# Patient Record
Sex: MALE | Race: ASIAN | NOT HISPANIC OR LATINO
[De-identification: names, ages, dates, MRNs, and addresses within clinical notes are randomized per-mention and may not be internally consistent; named-entity substitution may affect disease eponyms.]

---

## 2019-03-06 ENCOUNTER — APPOINTMENT (OUTPATIENT)
Dept: ORTHOPEDIC SURGERY | Facility: CLINIC | Age: 64
End: 2019-03-06
Payer: COMMERCIAL

## 2019-03-06 VITALS
HEIGHT: 61 IN | HEART RATE: 51 BPM | DIASTOLIC BLOOD PRESSURE: 95 MMHG | WEIGHT: 130 LBS | BODY MASS INDEX: 24.55 KG/M2 | SYSTOLIC BLOOD PRESSURE: 164 MMHG

## 2019-03-06 PROCEDURE — 73030 X-RAY EXAM OF SHOULDER: CPT | Mod: LT

## 2019-03-06 PROCEDURE — 99203 OFFICE O/P NEW LOW 30 MIN: CPT

## 2019-03-06 NOTE — PHYSICAL EXAM
[de-identified] : General Exam\par \par Well developed, well nourished\par No apparent distress\par Oriented to person, place, and time\par Mood: Normal\par Affect: Normal\par Balance and coordination: Normal\par Gait: Normal\par \par Left shoulder exam\par \par Inspection: No swelling, ecchymosis or gross deformity.\par Skin: No masses, No lesions\par Neck: Negative Spurlings, full ROM without pain\par Tenderness: No bicipital tenderness, no tenderness to the greater tuberosity/RTC insertion, no anterior shoulder/lesser tuberosity tenderness. No tenderness SC joint, clavicle, AC joint.\par ROM: 160/60/T6\par Impingement tests: Positive Gorman\par AC Joint: no pain with cross arm testing\par Biceps: Negative speed\par Strength: 5-/5 abduction, 5/5 external rotation, and internal rotation\par Neuro: AIN, PIN, Ulnar nerve motor intact\par Sensation: Intact to light touch in radial, median, ulnar, and axillary nerve distributions\par Vasc: 2+ radial pulse\par  [de-identified] : \par The following radiographs were ordered and read by me during this patients visit. I reviewed each radiograph in detail with the patient and discussed the findings as highlighted below. \par \par 3 views left shoulder] were obtained today that show no fracture, dislocation. There is no degenerative change seen. There is no malalignment. No obvious osseous abnormality. Otherwise unremarkable.\par \par MRI left shoulder reviewed. There is high-grade partial thickness to near full-thickness tearing of the supraspinatus tendon.

## 2019-03-06 NOTE — HISTORY OF PRESENT ILLNESS
[de-identified] : 62yo male presents complaining of left shoulder pain for 3 months. Pain lateral aspect shoulder worse with overhead activity and reaching behind back. He has pain at night. He saw his PCP who sent him for MRI at [facility].  He has tried physical therapy for one month with mild relief. He has not tried any medicine or injections. No injuries reported. Denies numbness tingling\par \par The patient's past medical history, past surgical history, medications, allergies, and social history were reviewed by me today with the patient and documented accordingly. In addition, the patient's family history, which is noncontributory to this visit, was also reviewed.\par

## 2019-03-06 NOTE — ASSESSMENT
[FreeTextEntry1] : 63-year-old male left shoulder rotator cuff tear. An extensive nonoperative treatment he continues to experience pain we discussed continued nonoperative treatment including medication physical therapy medications injections. We discussed surgical treatment with arthroscopy and rotator cuff repair. He wishes to continue physical therapy at this time. If symptoms do not improve we'll again discuss arthroscopy. All questions answered

## 2019-05-22 ENCOUNTER — APPOINTMENT (OUTPATIENT)
Dept: ORTHOPEDIC SURGERY | Facility: CLINIC | Age: 64
End: 2019-05-22

## 2021-11-30 ENCOUNTER — APPOINTMENT (OUTPATIENT)
Dept: GASTROENTEROLOGY | Facility: CLINIC | Age: 66
End: 2021-11-30
Payer: MEDICARE

## 2021-11-30 DIAGNOSIS — M75.112 INCOMPLETE ROTATOR CUFF TEAR OR RUPTURE OF LEFT SHOULDER, NOT SPECIFIED AS TRAUMATIC: ICD-10-CM

## 2021-11-30 DIAGNOSIS — Z87.19 PERSONAL HISTORY OF OTHER DISEASES OF THE DIGESTIVE SYSTEM: ICD-10-CM

## 2021-11-30 DIAGNOSIS — D62 ACUTE POSTHEMORRHAGIC ANEMIA: ICD-10-CM

## 2021-11-30 PROCEDURE — 99204 OFFICE O/P NEW MOD 45 MIN: CPT | Mod: 95

## 2021-12-01 PROBLEM — M75.112 INCOMPLETE TEAR OF LEFT ROTATOR CUFF: Status: RESOLVED | Noted: 2019-03-06 | Resolved: 2021-12-01

## 2021-12-01 RX ORDER — FLUTICASONE PROPIONATE 50 UG/1
50 SPRAY, METERED NASAL
Qty: 16 | Refills: 0 | Status: ACTIVE | COMMUNITY
Start: 2021-06-22

## 2021-12-01 RX ORDER — AMLODIPINE BESYLATE 10 MG/1
10 TABLET ORAL
Qty: 90 | Refills: 0 | Status: ACTIVE | COMMUNITY
Start: 2021-05-10

## 2021-12-01 RX ORDER — METFORMIN HYDROCHLORIDE 500 MG/1
500 TABLET, COATED ORAL
Qty: 180 | Refills: 0 | Status: ACTIVE | COMMUNITY
Start: 2021-01-29

## 2021-12-01 RX ORDER — METOPROLOL SUCCINATE 25 MG/1
25 TABLET, EXTENDED RELEASE ORAL
Qty: 90 | Refills: 0 | Status: ACTIVE | COMMUNITY
Start: 2021-05-10

## 2021-12-14 NOTE — REASON FOR VISIT
[Initial Evaluation] : an initial evaluation [FreeTextEntry1] : history of colonic polyp resected in 2014, diverticular hemorrhage, onset of dyspepsia and weight loss

## 2021-12-14 NOTE — HISTORY OF PRESENT ILLNESS
[Home] : at home, [unfilled] , at the time of the visit. [Medical Office: ([facility])___] : at the medical office located in  [Spouse] : spouse [Verbal consent obtained from patient] : the patient, [unfilled] [de-identified] : weight loss of 5 lbs. in 6 months, occasional heartburn and dyspepsia\par \par Aba Trevizo, a 66 year old man, is seen for evaluation of weight loss of 5 lbs. over the past 6 months with occasional heartburn and dyspepsia.    \par \par He had been admitted in April 2014 for multiple episodes of hematochezia with drop in hemoglobin requiring 2 units of packed cells. He had had an episode of diverticulitis in December 2013. He had also reported intermittent LLQ pain, mild, not related to the rectal bleed. He denied similar episodes in the past. He also denied dizziness, chest pain, palpitation, nausea, vomiting or fever. A CT-angio of abdomen did not demonstrate any evidence of active bleeding. Colonoscopy revealed marked diverticulosis in the sigmoid and descending colon without active bleeding or a definitive source of bleeding. He has not had further hematochezia and feels well.\par \par He father had colon cancer.  There is no other family history of colon cancer, colon polyp, peptic ulcer disease, female genitourinary disease, liver disease, cholelithiasis, pancreatic disease or cancer, inflammatory bowel disease or celiac disease.

## 2021-12-14 NOTE — HISTORY OF PRESENT ILLNESS
[Home] : at home, [unfilled] , at the time of the visit. [Medical Office: ([facility])___] : at the medical office located in  [Spouse] : spouse [Verbal consent obtained from patient] : the patient, [unfilled] [de-identified] : This visit was performed via Telehealth realtime 2-way audiovisual technology and lasted 50 minutes. \par \par Aba Trevizo, a  66 year old man, is seen evaluation of unintentional weight loss of 5 lbs. in the past  6 months associated with early satiety,  occasional heartburn and dyspepsia.  He is concerned about this problem, especially since there is a family history of cancer; his father had colon cancer.  THe patient's last colonoscopy was in 2014 when a hyperplastic polyp was resected from the sigmoid colon and marked diverticulosis was noted in the left colon.  Internal hemorrhoids were also noted.  He denies nausea, vomiting, dysphagia, hematochezia, melena.\par \par I last saw him after discharge from [facility] in 2014.  . He had been admitted in April 2014 for multiple episodes of hematochezia with drop in hemoglobin requiring 2 units of packed cells. He had had an episode of diverticulitis in December 2013. He had also reported intermittent LLQ pain, mild, not related to the rectal bleed. He denied similar episodes in the past. He also denied dizziness, chest pain, palpitation, nausea, vomiting or fever. A CT-angio of abdomen did not demonstrate any evidence of active bleeding. Colonoscopy revealed marked diverticulosis in the sigmoid and descending colon without active bleeding or a definitive source of bleeding. He has not had further hematochezia and feels well.\par  \par His father had colon cancer.  There is no other family history of colon cancer, colon polyp, peptic ulcer disease, female genitourinary disease, liver disease, cholelithiasis, pancreatic disease or cancer, inflammatory bowel disease or celiac disease.

## 2021-12-14 NOTE — ASSESSMENT
[FreeTextEntry1] : Assessment\par 1) increased risk for colon cancer in view of family history in a first degree relative\par 2) unintentional weight loss with dyspepsia is suggestive of an UGI lesion such as ulcer or tumor, or pancreatic lesion\par 3) diverticulosis with history of diverticular hemorrhage\par \par Plan\par 1) high fiber diet with popcorn\par 2) EGD and  Colonoscopy risks, benefits and preparation discussed with the patient \par 3) blood test results from primary care MD\par 4) office follow-up after procedures

## 2021-12-14 NOTE — ASSESSMENT
[FreeTextEntry1] : Assessment\par 1) Colon Cancer high risk due to history in a first degree relative\par 2) unintentional weight loss with dyspepsia, rule out UGI lesion such as gastric ulcer, tumor, pancreatic lesion\par 3) diverticulosis with previous history of diverticular hemorrhage\par \par Plan\par 1) EGD and  Colonoscopy risks, benefits and preparation discussed with the patient \par 2) high fiber diet including popcorn for diverticulosis\par 3) blood test results from his primary care physician

## 2022-03-09 ENCOUNTER — APPOINTMENT (OUTPATIENT)
Dept: GASTROENTEROLOGY | Facility: HOSPITAL | Age: 67
End: 2022-03-09

## 2022-03-09 ENCOUNTER — OUTPATIENT (OUTPATIENT)
Dept: OUTPATIENT SERVICES | Facility: HOSPITAL | Age: 67
LOS: 1 days | Discharge: HOME | End: 2022-03-09
Payer: MEDICARE

## 2022-03-09 ENCOUNTER — RESULT REVIEW (OUTPATIENT)
Age: 67
End: 2022-03-09

## 2022-03-09 VITALS
TEMPERATURE: 98 F | RESPIRATION RATE: 18 BRPM | SYSTOLIC BLOOD PRESSURE: 158 MMHG | DIASTOLIC BLOOD PRESSURE: 88 MMHG | HEART RATE: 64 BPM | OXYGEN SATURATION: 100 % | WEIGHT: 138.01 LBS | HEIGHT: 64 IN

## 2022-03-09 VITALS
DIASTOLIC BLOOD PRESSURE: 81 MMHG | OXYGEN SATURATION: 99 % | HEART RATE: 60 BPM | SYSTOLIC BLOOD PRESSURE: 136 MMHG | RESPIRATION RATE: 21 BRPM

## 2022-03-09 DIAGNOSIS — K63.5 POLYP OF COLON: ICD-10-CM

## 2022-03-09 DIAGNOSIS — Z98.89 OTHER SPECIFIED POSTPROCEDURAL STATES: Chronic | ICD-10-CM

## 2022-03-09 LAB
GLUCOSE BLDC GLUCOMTR-MCNC: 110 MG/DL — HIGH (ref 70–99)
GLUCOSE BLDC GLUCOMTR-MCNC: 98 MG/DL — SIGNIFICANT CHANGE UP (ref 70–99)

## 2022-03-09 PROCEDURE — 88305 TISSUE EXAM BY PATHOLOGIST: CPT | Mod: 26

## 2022-03-09 PROCEDURE — 45378 DIAGNOSTIC COLONOSCOPY: CPT

## 2022-03-09 PROCEDURE — 43239 EGD BIOPSY SINGLE/MULTIPLE: CPT

## 2022-03-09 RX ORDER — POLYETHYLENE GLYCOL 3350 AND ELECTROLYTES WITH LEMON FLAVOR 236; 22.74; 6.74; 5.86; 2.97 G/4L; G/4L; G/4L; G/4L; G/4L
236 POWDER, FOR SOLUTION ORAL
Qty: 1 | Refills: 0 | Status: COMPLETED | COMMUNITY
Start: 2021-12-01 | End: 2022-03-09

## 2022-03-09 RX ORDER — SODIUM CHLORIDE 9 MG/ML
500 INJECTION INTRAMUSCULAR; INTRAVENOUS; SUBCUTANEOUS
Refills: 0 | Status: DISCONTINUED | OUTPATIENT
Start: 2022-03-09 | End: 2022-03-23

## 2022-03-09 NOTE — ASU PATIENT PROFILE, ADULT - NSICDXPASTMEDICALHX_GEN_ALL_CORE_FT
PAST MEDICAL HISTORY:  Diverticulitis     DM (diabetes mellitus)     HLD (hyperlipidemia)     HTN (hypertension)

## 2022-03-09 NOTE — ASU PATIENT PROFILE, ADULT - FALL HARM RISK - UNIVERSAL INTERVENTIONS
Bed in lowest position, wheels locked, appropriate side rails in place/Call bell, personal items and telephone in reach/Instruct patient to call for assistance before getting out of bed or chair/Non-slip footwear when patient is out of bed/[place] to call system/Physically safe environment - no spills, clutter or unnecessary equipment/Purposeful Proactive Rounding/Room/bathroom lighting operational, light cord in reach

## 2022-03-16 LAB — SURGICAL PATHOLOGY STUDY: SIGNIFICANT CHANGE UP

## 2022-04-19 ENCOUNTER — APPOINTMENT (OUTPATIENT)
Dept: GASTROENTEROLOGY | Facility: CLINIC | Age: 67
End: 2022-04-19
Payer: MEDICARE

## 2022-04-19 VITALS — WEIGHT: 139 LBS | BODY MASS INDEX: 26.26 KG/M2

## 2022-04-19 DIAGNOSIS — R10.13 EPIGASTRIC PAIN: ICD-10-CM

## 2022-04-19 DIAGNOSIS — R63.4 ABNORMAL WEIGHT LOSS: ICD-10-CM

## 2022-04-19 DIAGNOSIS — R68.81 EARLY SATIETY: ICD-10-CM

## 2022-04-19 DIAGNOSIS — K57.31 DIVERTICULOSIS OF LARGE INTESTINE W/OUT PERFORATION OR ABSCESS WITH BLEEDING: ICD-10-CM

## 2022-04-19 PROCEDURE — 99214 OFFICE O/P EST MOD 30 MIN: CPT | Mod: 95

## 2022-04-19 RX ORDER — FAMOTIDINE 20 MG/1
20 TABLET, FILM COATED ORAL TWICE DAILY
Qty: 60 | Refills: 11 | Status: ACTIVE | COMMUNITY
Start: 2022-04-19 | End: 1900-01-01

## 2022-04-20 PROBLEM — R68.81 EARLY SATIETY: Status: ACTIVE | Noted: 2021-12-14

## 2022-04-20 PROBLEM — R63.4 WEIGHT LOSS, NON-INTENTIONAL: Status: ACTIVE | Noted: 2021-11-30

## 2022-04-20 RX ORDER — PANTOPRAZOLE 40 MG/1
40 TABLET, DELAYED RELEASE ORAL
Qty: 90 | Refills: 0 | Status: DISCONTINUED | COMMUNITY
Start: 2021-10-05 | End: 2022-04-20

## 2022-04-20 NOTE — HISTORY OF PRESENT ILLNESS
[Home] : at home, [unfilled] , at the time of the visit. [Medical Office: ([facility])___] : at the medical office located in  [Spouse] : spouse [Verbal consent obtained from patient] : the patient, [unfilled] [de-identified] : \par Aba Trevizo, a 66 year old man, is seen for follow-up evaluation of unintentional weight loss of 5 lbs. in the past 6 months associated with early satiety, occasional heartburn and dyspepsia.   He denies nausea, vomiting, dysphagia, hematochezia, melena.   Because of concern about this problem, especially since there is a family history of cancer (his father had colon cancer), he underwent EGD and colonoscopy  on March 9, 2022.   EGD revealed a 2 cm hiatal hernia with a mild Schatzki ring which was treated by multiple biopsies.   Biopsies revealed mild reflux esophagitis.  Gastric biopsies revealed chronic focally active gastritis without H. pylori infection or intestinal metaplasia noted.  A few fundic gland polyps were noted and confirmed by biopsy.   Colonoscopy  revealed diverticulosis throughout the entire colon and internal hemorrhoids.   He feels better now that the endoscopies did not reveal any serious problems and has gained 9 lbs.   I recommended that he take famotidine 20 mg whenever he notes heartburn in place of a PPI since famotidine works more rapidly (< 1 hour) and his heartburn episodes are infrequent.  I also recommended that he eat a high fiber diet and popcorn to reduce the risk of problems with diverticulitis or bleeding.  There is no restriction as to eating nuts or seeds.  \par \par The patient's previous colonoscopy was in 2014 when a hyperplastic polyp was resected from the sigmoid colon and marked diverticulosis was noted in the left colon.  He had previously  been admitted to [facility] in 2014 for hematochezia due to diverticular hemorrhage.  Internal hemorrhoids were also noted.   He had been admitted in April 2014 for multiple episodes of hematochezia with drop in hemoglobin requiring 2 units of packed cells. He had had an episode of diverticulitis in December 2013. He had also reported intermittent LLQ pain, mild, not related to the rectal bleeding. He had denied similar episodes in the past. He also denied dizziness, chest pain, palpitation, nausea, vomiting or fever.   A CT angiogram of the abdomen did not demonstrate any evidence of active bleeding. Colonoscopy revealed marked diverticulosis in the sigmoid and descending colon without active bleeding or a definitive source of bleeding. \par  \par His father had colon cancer. There is no other family history of colon cancer, colon polyp, peptic ulcer disease, female genitourinary disease, liver disease, cholelithiasis, pancreatic disease or cancer, inflammatory bowel disease or celiac disease.

## 2022-04-20 NOTE — ASSESSMENT
[FreeTextEntry1] : Assessment\par 1) increased risk for colon cancer in view of family history in a first degree relative, colonoscopy negative for polyps in 2022\par 2) patient had unintentional weight loss with dyspepsia which was suggestive of an UGI lesion such as ulcer or tumor, or pancreatic lesion; however, the EGD was negative for significant lesions and he is feeling fine now and has gained 9 lbs\par 3) diverticulosis with history of diverticular hemorrhage and diverticulitis\par \par Plan\par 1) high fiber diet with popcorn\par 2) Colonoscopy for high risk for colon cancer to be done in 2027 \par 3) blood test results from primary care MD\par 4) office follow-up in 3 months to verify improvement of symptoms using famotidine; if weight loss and dyspepsia recur, I recommended to patient and wife that a CAT scan should be considered to rule out a pancreatic lesion\par

## 2023-02-02 ENCOUNTER — APPOINTMENT (OUTPATIENT)
Dept: UROLOGY | Facility: CLINIC | Age: 68
End: 2023-02-02
Payer: MEDICARE

## 2023-02-02 VITALS
DIASTOLIC BLOOD PRESSURE: 68 MMHG | HEIGHT: 61 IN | HEART RATE: 60 BPM | WEIGHT: 135 LBS | SYSTOLIC BLOOD PRESSURE: 128 MMHG | BODY MASS INDEX: 25.49 KG/M2

## 2023-02-02 DIAGNOSIS — Z80.42 FAMILY HISTORY OF MALIGNANT NEOPLASM OF PROSTATE: ICD-10-CM

## 2023-02-02 DIAGNOSIS — N50.811 RIGHT TESTICULAR PAIN: ICD-10-CM

## 2023-02-02 DIAGNOSIS — K63.5 POLYP OF COLON: ICD-10-CM

## 2023-02-02 DIAGNOSIS — Z00.00 ENCOUNTER FOR GENERAL ADULT MEDICAL EXAMINATION W/OUT ABNORMAL FINDINGS: ICD-10-CM

## 2023-02-02 DIAGNOSIS — Z91.89 OTHER SPECIFIED PERSONAL RISK FACTORS, NOT ELSEWHERE CLASSIFIED: ICD-10-CM

## 2023-02-02 LAB
PSA FREE FLD-MCNC: 14 %
PSA FREE SERPL-MCNC: 0.2 NG/ML
PSA SERPL-MCNC: 1.4 NG/ML

## 2023-02-02 PROCEDURE — 99203 OFFICE O/P NEW LOW 30 MIN: CPT

## 2023-02-02 NOTE — LETTER BODY
[FreeTextEntry1] : Arcenio Fountain MD\par [address]\[place] [address]\par Phone: [phone]\par \par Dear. Dr. Fountain,\par  \par Reason for Visit: Abnormal examination. Right test lesion.\par \par This is a 67 year old male with an abnormal examination and right testicular lesion. Patient is referred for evaluation of his condition. He has a small right spermatocele and small right inguinal hernia.. Patient denies any gross hematuria or dysuria or urinary incontinence. The patient denies any aggravating or relieving factors. The patient denies any interference of function. The patient is entirely asymptomatic. All other review of systems are negative. He has cancer in his family medical history. He has previous surgical history. Past medical history, family history and social history were inquired and were noncontributory to current condition. The patient does not use tobacco or drink alcohol. Medications and allergies were reviewed. He has no known allergies to medication. \par \par On examination, the patient is a healthy-appearing male in no acute distress. He is alert and oriented follows commands. He has normal mood and affect. He is normocephalic. Oral no thrush. Neck is supple. Respirations are unlabored. His abdomen is soft and nontender. Liver is nonpalpable. Bladder is nonpalpable. No CVA tenderness. Neurologically he is grossly intact. No peripheral edema. Skin without gross abnormality. He has normal male external genitalia. Normal meatus. Bilateral testes are descended intrascrotally and normal to palpation. On rectal examination, there is normal sphincter tone. The prostate is clinically benign without focal induration or nodularity.\par \par Assessment: Right spermatocele. Right inguinal hernia. \par \par I counseled the patient. I discussed the various etiologies of his symptoms. I recommended he obtain PSA and BMP today to establish baselines. I encouraged him to obtain a testicular ultrasound. I counseled the patient regarding the procedure. The risks and benefits were discussed. Alternatives were given. I answered the patient questions. The patient will take the necessary preparations for the procedure. Risks and alternatives were discussed. I answered the patient questions. The patient will follow-up as directed and will contact me with any questions or concerns. Thank you for the opportunity to participate in the care of this patient. I'll keep you updated on his progress.\par \par Plan: Testicular ultrasound. PSA. BMP. Follow up in 1 year.

## 2023-02-02 NOTE — ADDENDUM
[FreeTextEntry1] : Entered by Veronica Mccarthy, acting as scribe for Dr. Jeremi Villatoro.\par The documentation recorded by the scribe accurately reflects the service I personally performed and the decisions made by me.

## 2023-02-03 LAB
ANION GAP SERPL CALC-SCNC: 12 MMOL/L
APPEARANCE: CLEAR
BACTERIA: NEGATIVE
BILIRUBIN URINE: NEGATIVE
BLOOD URINE: NEGATIVE
BUN SERPL-MCNC: 11 MG/DL
C TRACH RRNA SPEC QL NAA+PROBE: NOT DETECTED
CALCIUM SERPL-MCNC: 9.6 MG/DL
CHLORIDE SERPL-SCNC: 96 MMOL/L
CO2 SERPL-SCNC: 26 MMOL/L
COLOR: COLORLESS
CREAT SERPL-MCNC: 0.8 MG/DL
EGFR: 96 ML/MIN/1.73M2
GLUCOSE QUALITATIVE U: NEGATIVE
GLUCOSE SERPL-MCNC: 161 MG/DL
HYALINE CASTS: 0 /LPF
KETONES URINE: NEGATIVE
LEUKOCYTE ESTERASE URINE: NEGATIVE
MICROSCOPIC-UA: NORMAL
N GONORRHOEA RRNA SPEC QL NAA+PROBE: NOT DETECTED
NITRITE URINE: NEGATIVE
PH URINE: 6.5
POTASSIUM SERPL-SCNC: 4 MMOL/L
PROTEIN URINE: NEGATIVE
RED BLOOD CELLS URINE: 0 /HPF
SODIUM SERPL-SCNC: 134 MMOL/L
SOURCE AMPLIFICATION: NORMAL
SPECIFIC GRAVITY URINE: 1
SQUAMOUS EPITHELIAL CELLS: 0 /HPF
UROBILINOGEN URINE: NORMAL
WHITE BLOOD CELLS URINE: 0 /HPF

## 2023-02-04 LAB — BACTERIA UR CULT: NORMAL

## 2023-02-08 ENCOUNTER — APPOINTMENT (OUTPATIENT)
Dept: ULTRASOUND IMAGING | Facility: CLINIC | Age: 68
End: 2023-02-08
Payer: MEDICARE

## 2023-02-08 ENCOUNTER — OUTPATIENT (OUTPATIENT)
Dept: OUTPATIENT SERVICES | Facility: HOSPITAL | Age: 68
LOS: 1 days | End: 2023-02-08
Payer: MEDICARE

## 2023-02-08 DIAGNOSIS — K63.5 POLYP OF COLON: ICD-10-CM

## 2023-02-08 DIAGNOSIS — Z98.89 OTHER SPECIFIED POSTPROCEDURAL STATES: Chronic | ICD-10-CM

## 2023-02-08 DIAGNOSIS — N50.811 RIGHT TESTICULAR PAIN: ICD-10-CM

## 2023-02-08 PROCEDURE — 76870 US EXAM SCROTUM: CPT

## 2023-02-08 PROCEDURE — 76870 US EXAM SCROTUM: CPT | Mod: 26

## 2024-09-11 DIAGNOSIS — R63.4 ABNORMAL WEIGHT LOSS: ICD-10-CM

## 2024-09-12 ENCOUNTER — APPOINTMENT (OUTPATIENT)
Dept: UROLOGY | Facility: CLINIC | Age: 69
End: 2024-09-12
Payer: MEDICARE

## 2024-09-12 DIAGNOSIS — N50.811 RIGHT TESTICULAR PAIN: ICD-10-CM

## 2024-09-12 LAB
ANION GAP SERPL CALC-SCNC: 15 MMOL/L
BUN SERPL-MCNC: 11 MG/DL
CALCIUM SERPL-MCNC: 9.7 MG/DL
CHLORIDE SERPL-SCNC: 97 MMOL/L
CO2 SERPL-SCNC: 24 MMOL/L
CREAT SERPL-MCNC: 0.77 MG/DL
EGFR: 97 ML/MIN/1.73M2
GLUCOSE SERPL-MCNC: 95 MG/DL
POTASSIUM SERPL-SCNC: 4.6 MMOL/L
PSA FREE FLD-MCNC: 14 %
PSA FREE SERPL-MCNC: 0.25 NG/ML
PSA SERPL-MCNC: 1.88 NG/ML
SODIUM SERPL-SCNC: 136 MMOL/L

## 2024-09-12 PROCEDURE — 99214 OFFICE O/P EST MOD 30 MIN: CPT

## 2024-09-12 PROCEDURE — G2211 COMPLEX E/M VISIT ADD ON: CPT

## 2024-09-12 NOTE — HISTORY OF PRESENT ILLNESS
[FreeTextEntry1] : Follow-up for on and off right testicular pain since last year, no medication needed, reports increasing in size about 3 months ago, not fluctuating in size  On examination small right spermatocele.  Pain minimal.  Reassured by patient.  Ultrasound.  PSA.  Urinalysis.  Please refer to URO Consult Note.

## 2024-09-12 NOTE — LETTER BODY
[FreeTextEntry1] : Unable to collect PCP.   Reason for Visit: Abnormal examination. Right spermatocele.    This is a 68-year old male with an abnormal examination. He has a small right spermatocele and small right inguinal hernia. Patient returns today for follow-up.  He reports minimal intermittent pain. Patient denies any gross hematuria or dysuria or urinary incontinence. The patient denies any aggravating or relieving factors. The patient denies any interference of function. The patient is entirely asymptomatic. All other review of systems are negative. He has cancer in his family medical history. He has previous surgical history. Past medical history, family history and social history were inquired and were noncontributory to current condition. The patient does not use tobacco or drink alcohol. Medications and allergies were reviewed. He has no known allergies to medication.    On examination, the patient is a healthy-appearing male in no acute distress. He is alert and oriented follows commands. He has normal mood and affect. He is normocephalic. Oral no thrush. Neck is supple. Respirations are unlabored. His abdomen is soft and nontender. Liver is nonpalpable. Bladder is nonpalpable. No CVA tenderness. Neurologically he is grossly intact. No peripheral edema. Skin without gross abnormality. He has normal male external genitalia. Normal meatus. Bilateral testes are descended intrascrotally and normal to palpation. On rectal examination, there is normal sphincter tone. The prostate is clinically benign without focal induration or nodularity. On examination, there is a small right spermatocele.     Assessment: Right spermatocele. Right inguinal hernia.    I counseled the patient. In terms of his right spermatocele, the patient reports that his pain is minimal. I reassured the patient as his examination showed a small spermatocele. I recommended the patient obtain testicular ultrasound to ensure stability. I counseled the patient regarding the procedure. The patient will take the necessary preparations for the procedure. I recommended the patient repeat PSA and BMP to ensure stability.  He will also obtain urinalysis and urine culture to look for infections. Risks and alternatives were discussed. I answered the patient questions. The patient will follow-up as directed and will contact me with any questions or concerns. Thank you for the opportunity to participate in the care of this patient. I'll keep you updated on his progress.    Plan: Testicular ultrasound. PSA. BMP. Urinalysis. Urine culture. Follow up in 1 year.

## 2024-09-12 NOTE — ADDENDUM
[FreeTextEntry1] : Entered by Owen Dudley, acting as scribe for Dr. Jeremi Villatoro. The documentation recorded by the scribe accurately reflects the service I personally performed and the decisions made by me.

## 2024-09-13 LAB
APPEARANCE: CLEAR
BACTERIA UR CULT: NORMAL
BACTERIA: NEGATIVE /HPF
BILIRUBIN URINE: NEGATIVE
BLOOD URINE: NEGATIVE
CAST: 0 /LPF
COLOR: YELLOW
EPITHELIAL CELLS: 0 /HPF
GLUCOSE QUALITATIVE U: NEGATIVE MG/DL
KETONES URINE: NEGATIVE MG/DL
LEUKOCYTE ESTERASE URINE: NEGATIVE
MICROSCOPIC-UA: NORMAL
NITRITE URINE: NEGATIVE
PH URINE: 7
PROTEIN URINE: NEGATIVE MG/DL
RED BLOOD CELLS URINE: 0 /HPF
SPECIFIC GRAVITY URINE: 1.01
UROBILINOGEN URINE: 0.2 MG/DL
WHITE BLOOD CELLS URINE: 0 /HPF

## 2024-09-16 ENCOUNTER — OUTPATIENT (OUTPATIENT)
Dept: OUTPATIENT SERVICES | Facility: HOSPITAL | Age: 69
LOS: 1 days | End: 2024-09-16
Payer: MEDICARE

## 2024-09-16 ENCOUNTER — APPOINTMENT (OUTPATIENT)
Dept: ULTRASOUND IMAGING | Facility: IMAGING CENTER | Age: 69
End: 2024-09-16
Payer: MEDICARE

## 2024-09-16 DIAGNOSIS — Z98.89 OTHER SPECIFIED POSTPROCEDURAL STATES: Chronic | ICD-10-CM

## 2024-09-16 DIAGNOSIS — N50.811 RIGHT TESTICULAR PAIN: ICD-10-CM

## 2024-09-16 PROCEDURE — 76870 US EXAM SCROTUM: CPT | Mod: 26

## 2024-09-16 PROCEDURE — 76870 US EXAM SCROTUM: CPT

## (undated) DEVICE — BASIN EMESIS 10IN GRADUATED MAUVE

## (undated) DEVICE — CONTAINER FORMALIN 80ML YELLOW

## (undated) DEVICE — SALIVA EJECTOR (BLUE)

## (undated) DEVICE — FACESHIELD FULL VISOR

## (undated) DEVICE — LUBRICATING JELLY HR ONE SHOT 3G

## (undated) DEVICE — CONTAINER FORMALIN 10% 20ML

## (undated) DEVICE — DENTURE CUP PINK

## (undated) DEVICE — DRSG BANDAID 0.75X3"

## (undated) DEVICE — BITE BLOCK ADULT 20 X 27MM (GREEN)

## (undated) DEVICE — CATH IV SAFE BC 22G X 1" (BLUE)

## (undated) DEVICE — TUBING SUCTION NONCONDUCTIVE 6MM X 12FT

## (undated) DEVICE — ELCTR ECG CONDUCTIVE ADHESIVE

## (undated) DEVICE — TUBING MEDI-VAC W MAXIGRIP CONNECTORS 1/4"X6'

## (undated) DEVICE — SNARE EXACTO COLD 9MMX230CM

## (undated) DEVICE — LINE BREATHE SAMPLNG

## (undated) DEVICE — GOWN LG

## (undated) DEVICE — UNDERPAD LINEN SAVER 17 X 24"

## (undated) DEVICE — DRSG 2X2

## (undated) DEVICE — ELCTR GROUNDING PAD ADULT COVIDIEN

## (undated) DEVICE — POLY TRAP ETRAP

## (undated) DEVICE — TUBING IV SET GRAVITY 3Y 100" MACRO

## (undated) DEVICE — BIOPSY FORCEP RADIAL JAW 4 STANDARD WITH NEEDLE

## (undated) DEVICE — BIOPSY FORCEP COLD DISP

## (undated) DEVICE — CLAMP BX HOT RAD JAW 3

## (undated) DEVICE — PACK IV START WITH CHG

## (undated) DEVICE — DRSG CURITY GAUZE SPONGE 4 X 4" 12-PLY NON-STERILE